# Patient Record
Sex: FEMALE | Race: WHITE | NOT HISPANIC OR LATINO | URBAN - METROPOLITAN AREA
[De-identification: names, ages, dates, MRNs, and addresses within clinical notes are randomized per-mention and may not be internally consistent; named-entity substitution may affect disease eponyms.]

---

## 2017-11-27 ENCOUNTER — EMERGENCY (EMERGENCY)
Facility: HOSPITAL | Age: 22
LOS: 1 days | End: 2017-11-27

## 2017-11-27 VITALS
RESPIRATION RATE: 20 BRPM | OXYGEN SATURATION: 100 % | SYSTOLIC BLOOD PRESSURE: 107 MMHG | HEART RATE: 100 BPM | DIASTOLIC BLOOD PRESSURE: 76 MMHG | WEIGHT: 108.03 LBS | TEMPERATURE: 99 F | HEIGHT: 61 IN

## 2017-11-27 DIAGNOSIS — Z98.89 OTHER SPECIFIED POSTPROCEDURAL STATES: Chronic | ICD-10-CM

## 2020-02-04 ENCOUNTER — APPOINTMENT (OUTPATIENT)
Dept: ORTHOPEDIC SURGERY | Facility: CLINIC | Age: 25
End: 2020-02-04
Payer: MEDICAID

## 2020-02-04 VITALS
BODY MASS INDEX: 28.32 KG/M2 | HEIGHT: 61 IN | WEIGHT: 150 LBS | DIASTOLIC BLOOD PRESSURE: 67 MMHG | SYSTOLIC BLOOD PRESSURE: 105 MMHG | HEART RATE: 91 BPM

## 2020-02-04 DIAGNOSIS — M22.2X2 PATELLOFEMORAL DISORDERS, LEFT KNEE: ICD-10-CM

## 2020-02-04 DIAGNOSIS — M76.52 PATELLAR TENDINITIS, LEFT KNEE: ICD-10-CM

## 2020-02-04 PROCEDURE — 73564 X-RAY EXAM KNEE 4 OR MORE: CPT | Mod: LT

## 2020-02-04 PROCEDURE — 99203 OFFICE O/P NEW LOW 30 MIN: CPT

## 2020-02-04 NOTE — PHYSICAL EXAM
[de-identified] : Physical Exam:\par General: Well appearing, no acute distress, A&O\par Neurologic: A&Ox3, No focal deficits\par Head: NCAT without abrasions, lacerations, or ecchymosis to head, face, or scalp\par Eyes: No scleral icterus, no gross abnormalities\par Respiratory: Equal chest wall expansion bilaterally, no accessory muscle use\par Lymphatic: No lymphadenopathy palpated\par Skin: Warm and dry\par Psychiatric: Normal mood and affect\par \par Left Knee: Range of Motion in Degrees	\par 	  Claimant:	Normal:	\par Flexion Active	 135 	 135-degrees	\par Flexion Passive	 135	 135-degrees	\par Extension Active	 0-5	 0-5-degrees	\par Extension Passive	 0-5	 0-5-degrees	\par \par No tenderness over the tibial tubercle. Tenderness over the tendon at this time. No weakness to flexion/extension. Tenderness over the pes bursa. No evidence of instability in the AP plane or varus or valgus stress. Negative Lachman. Negative pivot shift. Negative anterior drawer test. Negative posterior drawer test. Negative Rissa. Negative Apley grind. No medial or lateral joint line tenderness. No tenderness over the medial and lateral facet of the patella. No patellofemoral crepitations. No lateral tilting patella. No patella apprehension. No crepitation in the medial and lateral femoral condyle. No proximal or distal swelling, edema or tenderness. No gross motor or sensory deficits. No intra-articular swelling. Extra-articular swelling over the proximal medial aspect of the tibia. 2+ DP and PT pulses. No varus or valgus malalignment. Skin is intact. No rashes, scars or lesions. \par  \par Right Knee: Range of Motion in Degrees	\par 	  Claimant:	Normal:	\par Flexion Active	 135 	 135-degrees	\par Flexion Passive	 135	 135-degrees	\par Extension Active	 0-5	 0-5-degrees	\par Extension Passive	 0-5	 0-5-degrees	\par \par No weakness to flexion/extension. No evidence of instability in the AP plane or varus or valgus stress. Negative Lachman. Negative pivot shift. Negative anterior drawer test. Negative posterior drawer test. Negative Rissa. Negative Apley grind. No medial or lateral joint line tenderness. No tenderness over the medial and lateral facet of the patella. No patellofemoral crepitations. No lateral tilting patella. No patellar apprehension. No crepitation in the medial and lateral femoral condyle. No proximal or distal swelling, edema or tenderness. No gross motor or sensory deficits. No intra-articular swelling. 2+ DP and PT pulses. No varus or valgus malalignment. Skin is intact. No rashes, scars or lesions. [de-identified] : The following radiographs were ordered and read by me during this patients visit. I reviewed each radiograph in detail with the patient and discussed the findings as highlighted below. \par \par 4 views of the left knee show no fracture, dislocation or bony lesions. There is no evidence of degenerative change in the medial, lateral and patellofemoral compartments with maintenance of the joint space. Otherwise unremarkable.

## 2020-02-04 NOTE — CONSULT LETTER
[Dear  ___] : Dear  [unfilled], [Please see my note below.] : Please see my note below. [Consult Closing:] : Thank you very much for allowing me to participate in the care of this patient.  If you have any questions, please do not hesitate to contact me. [Consult Letter:] : I had the pleasure of evaluating your patient, [unfilled]. [Sincerely,] : Sincerely, [FreeTextEntry3] : Ever Pena DO.\par Sports Medicine \par \par Orthopaedic Surgery\par \par [facility]\par \par [address] \par [address]\[place] [address]\par \par [address] \[address] \[place] [address]\par \par Tel [phone]\par Fax [phone]\par \par  [FreeTextEntry2] : JAYDEN ESQUIVEL MD\par

## 2020-02-04 NOTE — HISTORY OF PRESENT ILLNESS
[Stable] : stable [de-identified] : HALIMA is a 24 year female who presents today with complaints of left knee pain after falling out of her bed directly onto her left knee in January. Since then she denies instability, catching or locking. Denies numbness/tingling. Her pain is to the anterior knee going up the stairs. She denies ecchymosis/ cracking or a pop sound after injury. She denies prior treatment for this issue.

## 2020-02-04 NOTE — DISCUSSION/SUMMARY
[de-identified] : HALIMA is a 24 year female with left  knee pain secondary to patellar tendonitis and patellofemoral pain syndrome. I have explained to the patient the anatomy of the patellofemoral joint. It includes the extensor mechanism (quadriceps tendon, quadriceps muscles, patella, patella tendon, and medial and lateral retinaculum). I have shown the patient that the articular cartilage gets a little softened. This is what is known as chondromalacia. If one theoretically were to remove or scrape all the articular cartilage, it would be identified as arthritis. Somewhat paradoxically, however, chondromalacia does not necessarily lead to arthritis or at least there is no evidence irrefutable at this point in time.\par \par Chondromalacia or anterior knee pain is a syndrome that hurts the patients more than it causes destruction to the knee; thus pain is not associated with destruction. Likewise, noises, cracking, and popping for the most part are not anymore significant than people cracking their knuckles. It is certainly not a sign of damage to the joint.\par Over 90% of the people with patellofemoral problems will get better if they understand the weight bearing stresses that are applied to the patellofemoral joint. The forces can range from 2 to 9 times your body weight per step and with abnormal alignment the average is usually higher. \par \par The treatment is to minimize weight-bearing stress and to strengthen the surrounding muscles. From a practical point of view, one can divide their lifestyle into activities of daily living, conditioning, and recreation. In activities of daily living one should feel free to walk around as necessary but only for functioning. If one has an option between stairs and an escalator, the latter is preferable.\par \par The conditioning aspect should be a non weight bearing program which is best achieved by bicycle riding at least 3 to 4 days a week. It should be done with increasing resistance for at least a half hour. The seat of the bike should always be kept at a position so that the knee stays within a 0 to 90 degree arc. This will avoid hyperextension and flexion beyond 90 degrees, which tends to aggravate symptoms of discomfort. Leg extension for stretching exercises are similarly kept within this arc of motion. Step machines are not advised as they tend to aggravate patellofemoral symptoms as do squats. A [place] machine is an alternative that is usually acceptable.\par \par The recreational part of their life is based on the fact that since pain is not leading to destruction, we will compromise and allow a recreational lifestyle. If indeed activity, albeit associated with impact does not yield any symptoms, they should feel free to participate. If an increase in activities is associated with increasing discomfort, the patient should use "common sense" and cut back on the level of activity. Recreation, however, is never to be used for conditioning even in an asymptomatic patient. The patient must always maintain a conditioning program. I think the patient understands this explanation.\par \par While over 90% of the people with this syndrome will do well non-operatively, some conscientious patients will still have symptoms. If so, they should be reevaluated to ascertain if they fall into a small category of people who require physical therapy or surgery.\par \par At this time she elected for nonoperative management with meloxicam and physical therapy. I gave her prescription for both. She was educated on the negative sequela with use of anti-depressants and NSAIDs and was advised to D/C medication if symptoms occur and to get clearance by her providing doctor before taking this medication. I will see her back in 8 weeks for clinical assessment. If she is pain free at that time we do not need to see her and will see her in the future prn. She agrees with the above plan and all questions were answered.

## 2020-12-28 ENCOUNTER — EMERGENCY (EMERGENCY)
Facility: HOSPITAL | Age: 25
LOS: 1 days | End: 2020-12-28
Admitting: EMERGENCY MEDICINE
Payer: MEDICAID

## 2020-12-28 DIAGNOSIS — Z98.89 OTHER SPECIFIED POSTPROCEDURAL STATES: Chronic | ICD-10-CM

## 2020-12-28 PROCEDURE — 99285 EMERGENCY DEPT VISIT HI MDM: CPT

## 2020-12-28 PROCEDURE — 93010 ELECTROCARDIOGRAM REPORT: CPT

## 2020-12-31 ENCOUNTER — NON-APPOINTMENT (OUTPATIENT)
Age: 25
End: 2020-12-31

## 2020-12-31 ENCOUNTER — APPOINTMENT (OUTPATIENT)
Dept: CARDIOLOGY | Facility: CLINIC | Age: 25
End: 2020-12-31
Payer: MEDICAID

## 2020-12-31 VITALS
OXYGEN SATURATION: 99 % | BODY MASS INDEX: 27.94 KG/M2 | HEART RATE: 105 BPM | HEIGHT: 61 IN | WEIGHT: 148 LBS | DIASTOLIC BLOOD PRESSURE: 76 MMHG | SYSTOLIC BLOOD PRESSURE: 110 MMHG | TEMPERATURE: 96.8 F

## 2020-12-31 DIAGNOSIS — Z82.49 FAMILY HISTORY OF ISCHEMIC HEART DISEASE AND OTHER DISEASES OF THE CIRCULATORY SYSTEM: ICD-10-CM

## 2020-12-31 DIAGNOSIS — Z86.59 PERSONAL HISTORY OF OTHER MENTAL AND BEHAVIORAL DISORDERS: ICD-10-CM

## 2020-12-31 DIAGNOSIS — R00.2 PALPITATIONS: ICD-10-CM

## 2020-12-31 DIAGNOSIS — Z78.9 OTHER SPECIFIED HEALTH STATUS: ICD-10-CM

## 2020-12-31 DIAGNOSIS — Z00.00 ENCOUNTER FOR GENERAL ADULT MEDICAL EXAMINATION W/OUT ABNORMAL FINDINGS: ICD-10-CM

## 2020-12-31 DIAGNOSIS — R07.9 CHEST PAIN, UNSPECIFIED: ICD-10-CM

## 2020-12-31 DIAGNOSIS — Z87.898 PERSONAL HISTORY OF OTHER SPECIFIED CONDITIONS: ICD-10-CM

## 2020-12-31 PROCEDURE — 93000 ELECTROCARDIOGRAM COMPLETE: CPT

## 2020-12-31 PROCEDURE — 99203 OFFICE O/P NEW LOW 30 MIN: CPT

## 2020-12-31 PROCEDURE — 99072 ADDL SUPL MATRL&STAF TM PHE: CPT

## 2020-12-31 RX ORDER — QUETIAPINE 200 MG/1
200 TABLET, FILM COATED ORAL
Refills: 0 | Status: ACTIVE | COMMUNITY

## 2020-12-31 RX ORDER — GABAPENTIN 100 MG
100 TABLET ORAL
Refills: 0 | Status: ACTIVE | COMMUNITY

## 2020-12-31 NOTE — DISCUSSION/SUMMARY
[FreeTextEntry1] : I reassured the patient that I did not think there was any cardiac issues involved.

## 2020-12-31 NOTE — HISTORY OF PRESENT ILLNESS
[FreeTextEntry1] : She has no chest pain\par She has no shortness of breath\par She has  palpitations\par She has no syncope\par She is neurologically intact\par She has no edema\par She has no skin rashes\par

## 2020-12-31 NOTE — REASON FOR VISIT
[Consultation] : a consultation regarding [Palpitations] : palpitations [FreeTextEntry1] : I saw this highly anxious 25-year-old woman in cardiac consultation on 12/31/20\par She has been treated with antianxiety medication has had as she describes psychotic breaks, and when she gets anxious her heart races.\par There is no family history of any medical issues

## 2020-12-31 NOTE — PHYSICAL EXAM
[General Appearance - Well Developed] : well developed [Normal Appearance] : normal appearance [Well Groomed] : well groomed [General Appearance - Well Nourished] : well nourished [No Deformities] : no deformities [General Appearance - In No Acute Distress] : no acute distress [Normal Conjunctiva] : the conjunctiva exhibited no abnormalities [Eyelids - No Xanthelasma] : the eyelids demonstrated no xanthelasmas [Normal Oral Mucosa] : normal oral mucosa [No Oral Pallor] : no oral pallor [No Oral Cyanosis] : no oral cyanosis [Normal Jugular Venous A Waves Present] : normal jugular venous A waves present [Normal Jugular Venous V Waves Present] : normal jugular venous V waves present [No Jugular Venous Hatch A Waves] : no jugular venous hatch A waves [Heart Rate And Rhythm] : heart rate and rhythm were normal [Heart Sounds] : normal S1 and S2 [Murmurs] : no murmurs present [Respiration, Rhythm And Depth] : normal respiratory rhythm and effort [Exaggerated Use Of Accessory Muscles For Inspiration] : no accessory muscle use [Auscultation Breath Sounds / Voice Sounds] : lungs were clear to auscultation bilaterally [Abdomen Soft] : soft [Abdomen Tenderness] : non-tender [Abdomen Mass (___ Cm)] : no abdominal mass palpated [Abnormal Walk] : normal gait [Gait - Sufficient For Exercise Testing] : the gait was sufficient for exercise testing [Nail Clubbing] : no clubbing of the fingernails [Cyanosis, Localized] : no localized cyanosis [Petechial Hemorrhages (___cm)] : no petechial hemorrhages [Skin Color & Pigmentation] : normal skin color and pigmentation [] : no rash [No Venous Stasis] : no venous stasis [Skin Lesions] : no skin lesions [No Skin Ulcers] : no skin ulcer [No Xanthoma] : no  xanthoma was observed [Oriented To Time, Place, And Person] : oriented to person, place, and time [Affect] : the affect was normal [Mood] : the mood was normal [No Anxiety] : not feeling anxious

## 2021-01-26 ENCOUNTER — APPOINTMENT (OUTPATIENT)
Dept: MRI IMAGING | Facility: CLINIC | Age: 26
End: 2021-01-26

## 2024-05-10 ENCOUNTER — TELEPHONE (OUTPATIENT)
Age: 29
End: 2024-05-10

## 2024-05-10 NOTE — TELEPHONE ENCOUNTER
Patient called in today requesting to establish care.  During the confirming of 3 ID I did update the phone and address.  When I asked who she would like to become established with she stated she did not care as she needed to get a PCP but also had a UTI.  I asked to put Phylicia on hold at this point.  I called [place] nurse and did warm transfer to [facility] since non established patient but stated UTI.

## 2024-09-21 ENCOUNTER — OFFICE VISIT (OUTPATIENT)
Dept: URGENT CARE | Facility: CLINIC | Age: 29
End: 2024-09-21
Payer: COMMERCIAL

## 2024-09-21 VITALS
DIASTOLIC BLOOD PRESSURE: 65 MMHG | TEMPERATURE: 97 F | SYSTOLIC BLOOD PRESSURE: 89 MMHG | RESPIRATION RATE: 18 BRPM | HEART RATE: 92 BPM | OXYGEN SATURATION: 98 %

## 2024-09-21 DIAGNOSIS — J02.9 SORE THROAT: ICD-10-CM

## 2024-09-21 DIAGNOSIS — J02.0 STREP PHARYNGITIS: Primary | ICD-10-CM

## 2024-09-21 LAB — S PYO AG THROAT QL: POSITIVE

## 2024-09-21 PROCEDURE — 99213 OFFICE O/P EST LOW 20 MIN: CPT | Performed by: PHYSICIAN ASSISTANT

## 2024-09-21 PROCEDURE — 87880 STREP A ASSAY W/OPTIC: CPT | Performed by: PHYSICIAN ASSISTANT

## 2024-09-21 RX ORDER — RISPERIDONE 0.5 MG/1
0.5 TABLET ORAL DAILY
COMMUNITY
Start: 2024-08-08

## 2024-09-21 RX ORDER — BUPRENORPHINE AND NALOXONE 2; .5 MG/1; MG/1
FILM, SOLUBLE BUCCAL; SUBLINGUAL
COMMUNITY
Start: 2024-09-09

## 2024-09-21 RX ORDER — CEPHALEXIN 500 MG/1
500 CAPSULE ORAL EVERY 12 HOURS SCHEDULED
Qty: 20 CAPSULE | Refills: 0 | Status: SHIPPED | OUTPATIENT
Start: 2024-09-21 | End: 2024-10-01

## 2024-09-21 RX ORDER — LAMOTRIGINE 100 MG/1
100 TABLET ORAL DAILY
COMMUNITY
Start: 2024-06-21

## 2024-09-21 NOTE — PROGRESS NOTES
[facility]        NAME: Phylicia Villalba is a 28 y.o. female  : 1995    MRN: [id number]  DATE: 2024  TIME: 2:40 PM    Assessment and Plan   Strep pharyngitis [J02.0]  1. Strep pharyngitis  cephalexin (KEFLEX) 500 mg capsule      2. Sore throat  POCT rapid strepA            Patient Instructions   Rapid strep is positive.  Take Keflex as prescribed.  Patient denies any prior reaction to cephalosporins.  Increase fluids and rest.  Tylenol/Ibuprofen for pain/fever  Salt water gargles and chloraseptic spray  Throat Coat Tea  Follow up with PCP if symptoms do not improve or worsen  Report to the ER with difficulty swallowing or fever uncontrolled with tylenol and ibuprofen     If tests have been performed at [facility], our office will contact you with results if changes need to be made to the care plan discussed with you at the visit.  You can review your full results on [facility]  Follow up with PCP in 3-5 days.  Proceed to  ER if symptoms worsen.    Chief Complaint     Chief Complaint   Patient presents with    Sore Throat     Patient here with sore throat, headache, swollen glands and nausea for 2 days now.          History of Present Illness       HPI  This is a 28-year-old female here complaining of headache, sore throat and bodyaches for the last 2 to 3 days.  She does not take any medicines for symptoms.  She denies vomiting, diarrhea, fever, nasal congestion, ear pain, cough, shortness of breath or chest pain.  Review of Systems   Review of Systems   Constitutional:  Negative for fever.   HENT:  Positive for sore throat. Negative for congestion and ear pain.    Respiratory:  Negative for cough and shortness of breath.    Cardiovascular:  Negative for chest pain.   Musculoskeletal:  Positive for myalgias.   Neurological:  Positive for headaches.         Current Medications       Current Outpatient Medications:     buprenorphine-naloxone (Suboxone) 2-0.5 mg, TAKE 1 FILM  SUBLINGUALLY EVERY DAY, Disp: , Rfl:     cephalexin (KEFLEX) 500 mg capsule, Take 1 capsule (500 mg total) by mouth every 12 (twelve) hours for 10 days, Disp: 20 capsule, Rfl: 0    lamoTRIgine (LaMICtal) 100 mg tablet, Take 100 mg by mouth daily, Disp: , Rfl:     risperiDONE (RisperDAL) 0.5 mg tablet, Take 0.5 mg by mouth daily, Disp: , Rfl:     Current Allergies     Allergies as of 09/21/2024 - Reviewed 09/21/2024   Allergen Reaction Noted    Amoxicillin Hives 09/21/2024    Penicillins Hives, Rash, and Swelling 08/31/2021            The following portions of the patient's history were reviewed and updated as appropriate: allergies, current medications, past family history, past medical history, past social history, past surgical history and problem list.     History reviewed. No pertinent past medical history.    History reviewed. No pertinent surgical history.    History reviewed. No pertinent family history.      Medications have been verified.        Objective   BP (!) 89/65   Pulse 92   Temp (!) 97 °F (36.1 °C)   Resp 18   SpO2 98%        Physical Exam     Physical Exam  Vitals and nursing note reviewed.   Constitutional:       General: She is not in acute distress.     Appearance: She is well-developed. She is not ill-appearing, toxic-appearing or diaphoretic.   HENT:      Right Ear: Tympanic membrane, ear canal and external ear normal.      Left Ear: Tympanic membrane, ear canal and external ear normal.      Nose: Nose normal.      Mouth/Throat:      Mouth: Mucous membranes are moist.      Pharynx: Posterior oropharyngeal erythema present.   Cardiovascular:      Rate and Rhythm: Normal rate and regular rhythm.   Pulmonary:      Effort: Pulmonary effort is normal.      Breath sounds: Normal breath sounds.   Neurological:      Mental Status: She is alert.

## 2024-12-06 ENCOUNTER — TELEPHONE (OUTPATIENT)
Age: 29
End: 2024-12-06

## 2024-12-06 NOTE — TELEPHONE ENCOUNTER
Patient scheduled on my chart for new patient and for her reason I am not sure if she should be seen sooner.  Can you please check when you have a chance? Thank you    DISPLAY PLAN FREE TEXT

## 2025-04-30 ENCOUNTER — OFFICE VISIT (OUTPATIENT)
Dept: FAMILY MEDICINE CLINIC | Facility: CLINIC | Age: 30
End: 2025-04-30
Payer: COMMERCIAL

## 2025-04-30 VITALS
TEMPERATURE: 98.3 F | SYSTOLIC BLOOD PRESSURE: 110 MMHG | BODY MASS INDEX: 21.53 KG/M2 | HEART RATE: 92 BPM | RESPIRATION RATE: 14 BRPM | WEIGHT: 117 LBS | HEIGHT: 62 IN | OXYGEN SATURATION: 99 % | DIASTOLIC BLOOD PRESSURE: 80 MMHG

## 2025-04-30 DIAGNOSIS — Z00.00 ANNUAL PHYSICAL EXAM: Primary | ICD-10-CM

## 2025-04-30 DIAGNOSIS — N92.6 MISSED MENSES: ICD-10-CM

## 2025-04-30 DIAGNOSIS — F33.1 MODERATE EPISODE OF RECURRENT MAJOR DEPRESSIVE DISORDER (HCC): ICD-10-CM

## 2025-04-30 DIAGNOSIS — E55.9 HYPOVITAMINOSIS D: ICD-10-CM

## 2025-04-30 PROBLEM — F32.A DEPRESSION: Status: ACTIVE | Noted: 2021-08-31

## 2025-04-30 PROBLEM — F41.1 GAD (GENERALIZED ANXIETY DISORDER): Status: ACTIVE | Noted: 2022-07-14

## 2025-04-30 LAB — SL AMB POCT URINE HCG: ABNORMAL

## 2025-04-30 PROCEDURE — 99385 PREV VISIT NEW AGE 18-39: CPT | Performed by: NURSE PRACTITIONER

## 2025-04-30 PROCEDURE — 81025 URINE PREGNANCY TEST: CPT | Performed by: NURSE PRACTITIONER

## 2025-04-30 RX ORDER — LURASIDONE HYDROCHLORIDE 40 MG/1
40 TABLET, FILM COATED ORAL DAILY
COMMUNITY
Start: 2025-04-03

## 2025-04-30 NOTE — PATIENT INSTRUCTIONS
"Patient Education     Routine physical for adults   The Basics   Written by the doctors and editors at [facility]   What is a physical? -- A physical is a routine visit, or \"check-up,\" with your doctor. You might also hear it called a \"wellness visit\" or \"preventive visit.\"  During each visit, the doctor will:   Ask about your physical and mental health   Ask about your habits, behaviors, and lifestyle   Do an exam   Give you vaccines if needed   Talk to you about any medicines you take   Give advice about your health   Answer your questions  Getting regular check-ups is an important part of taking care of your health. It can help your doctor find and treat any problems you have. But it's also important for preventing health problems.  A routine physical is different from a \"sick visit.\" A sick visit is when you see a doctor because of a health concern or problem. Since physicals are scheduled ahead of time, you can think about what you want to ask the doctor.  How often should I get a physical? -- It depends on your age and health. In general, for people age 21 years and older:   If you are younger than 50 years, you might be able to get a physical every 3 years.   If you are 50 years or older, your doctor might recommend a physical every year.  If you have an ongoing health condition, like diabetes or high blood pressure, your doctor will probably want to see you more often.  What happens during a physical? -- In general, each visit will include:   Physical exam - The doctor or nurse will check your height, weight, heart rate, and blood pressure. They will also look at your eyes and ears. They will ask about how you are feeling and whether you have any symptoms that bother you.   Medicines - It's a good idea to bring a list of all the medicines you take to each doctor visit. Your doctor will talk to you about your medicines and answer any questions. Tell them if you are having any side effects that bother you. You " "should also tell them if you are having trouble paying for any of your medicines.   Habits and behaviors - This includes:   Your diet   Your exercise habits   Whether you smoke, drink alcohol, or use drugs   Whether you are sexually active   Whether you feel safe at home  Your doctor will talk to you about things you can do to improve your health and lower your risk of health problems. They will also offer help and support. For example, if you want to quit smoking, they can give you advice and might prescribe medicines. If you want to improve your diet or get more physical activity, they can help you with this, too.   Lab tests, if needed - The tests you get will depend on your age and situation. For example, your doctor might want to check your:   Cholesterol   Blood sugar   Iron level   Vaccines - The recommended vaccines will depend on your age, health, and what vaccines you already had. Vaccines are very important because they can prevent certain serious or deadly infections.   Discussion of screening - \"Screening\" means checking for diseases or other health problems before they cause symptoms. Your doctor can recommend screening based on your age, risk, and preferences. This might include tests to check for:   Cancer, such as breast, prostate, cervical, ovarian, colorectal, prostate, lung, or skin cancer   Sexually transmitted infections, such as chlamydia and gonorrhea   Mental health conditions like depression and anxiety  Your doctor will talk to you about the different types of screening tests. They can help you decide which screenings to have. They can also explain what the results might mean.   Answering questions - The physical is a good time to ask the doctor or nurse questions about your health. If needed, they can refer you to other doctors or specialists, too.  Adults older than 65 years often need other care, too. As you get older, your doctor will talk to you about:   How to prevent falling at " home   Hearing or vision tests   Memory testing   How to take your medicines safely   Making sure that you have the help and support you need at home  All topics are updated as new evidence becomes available and our peer review process is complete.  This topic retrieved from [employer] on: May 02, 2024.  Topic 928064 Version 1.0  Release: 32.4.3 - C32.122  © 2024 UpToDate, Inc. and/or its affiliates. All rights reserved.  Consumer Information Use and Disclaimer   Disclaimer: This generalized information is a limited summary of diagnosis, treatment, and/or medication information. It is not meant to be comprehensive and should be used as a tool to help the user understand and/or assess potential diagnostic and treatment options. It does NOT include all information about conditions, treatments, medications, side effects, or risks that may apply to a specific patient. It is not intended to be medical advice or a substitute for the medical advice, diagnosis, or treatment of a health care provider based on the health care provider's examination and assessment of a patient's specific and unique circumstances. Patients must speak with a health care provider for complete information about their health, medical questions, and treatment options, including any risks or benefits regarding use of medications. This information does not endorse any treatments or medications as safe, effective, or approved for treating a specific patient. UpToDate, Inc. and its affiliates disclaim any warranty or liability relating to this information or the use thereof.The use of this information is governed by the Terms of Use, available at https://www.[employer]uwer.com/en/know/clinical-effectiveness-terms. 2024© UpToDate, Inc. and its affiliates and/or licensors. All rights reserved.  Copyright   © 2024 UpToDate, Inc. and/or its affiliates. All rights reserved.

## 2025-04-30 NOTE — PROGRESS NOTES
Adult Annual Physical  Name: Phylicia Villalba      : 1995      MRN: [id number]  Encounter Provider: DEMETRI Christie  Encounter Date: 2025   Encounter department: [facility] PRIMARY [facility]    :  Assessment & Plan  Annual physical exam  Annual physical completed.  Last seen by primary care physician more than 9 years ago.  Blood work ordered.  Patient has missed menses tested positive for pregnancy at home.  Blood work needed to reinforce.  Orders:    CBC and differential; Future    Comprehensive metabolic panel; Future    Lipid panel; Future    TSH, 3rd generation with Free T4 reflex; Future    Missed menses  Positive urine in office.  Quantitative ordered.  Patient does have upcoming appointment with [facility]  Orders:    POCT urine HCG    hCG, quantitative; Future    Hypovitaminosis D    Orders:    Vitamin D 25 hydroxy; Future    Moderate episode of recurrent major depressive disorder (HCC)  Patient does have major depressive order.  Follows with therapist.  Is taking Lamictal and Latuda.  Patient is currently pregnant discussed side effects of the medication.  Patient verbalizes that she is aware was taking same medications with her second pregnancy and her son is fine.  Education provided.                Preventive Screenings:  - Diabetes Screening: screening up-to-date and orders placed  - Cholesterol Screening: orders placed   - Hepatitis C screening: orders placed   - HIV screening: screening up-to-date   - Cervical cancer screening: orders placed   - Colon cancer screening: screening not indicated   - Lung cancer screening: screening not indicated     Immunizations:  - Immunizations due: Influenza    Counseling/Anticipatory Guidance:  - Alcohol: discussed moderation in alcohol intake and recommendations for healthy alcohol use.   - Drug use: discussed harms of illicit drug use and how it can negatively impact mental/physical health.   - Tobacco use: discussed harms of tobacco use  "and management options for quitting.   - Dental health: discussed importance of regular tooth brushing, flossing, and dental visits.   - Sexual health: discussed sexually transmitted diseases, partner selection, use of condoms, avoidance of unintended pregnancy, and contraceptive alternatives.   - Diet: discussed recommendations for a healthy/well-balanced diet.   - Exercise: the importance of regular exercise/physical activity was discussed. Recommend exercise 3-5 times per week for at least 30 minutes.   - Injury prevention: discussed safety/seat belts, safety helmets, smoke detectors, carbon monoxide detectors, and smoking near bedding or upholstery.          History of Present Illness     Adult Annual Physical:  Patient presents for annual physical.     Diet and Physical Activity:  - Diet/Nutrition: no special diet.  - Exercise: no formal exercise.    Depression Screening:  - PHQ-2 Score: 2    General Health:  - Sleep: sleeps poorly.  - Hearing: normal hearing bilateral ears.  - Vision: most recent eye exam > 1 year ago and no vision problems.  - Dental: brushes teeth twice daily.    /GYN Health:  - Follows with GYN: yes.   - Contraception:. positive pregnancy test      Advanced Care Planning:  - Has an advanced directive?: no    - Has a durable medical POA?: no    - ACP document given to patient?: no      Review of Systems   Constitutional: Negative.    HENT: Negative.     Eyes: Negative.    Respiratory: Negative.     Cardiovascular: Negative.    Gastrointestinal: Negative.    Endocrine: Negative.    Genitourinary:  Positive for menstrual problem.   Musculoskeletal: Negative.    Allergic/Immunologic: Negative.    Neurological: Negative.    Psychiatric/Behavioral: Negative.           Objective   /80 (BP Location: Left arm, Patient Position: Sitting, Cuff Size: Large)   Pulse 92   Temp 98.3 °F (36.8 °C) (Temporal)   Resp 14   Ht 5' 2\" (1.575 m)   Wt 53.1 kg (117 lb)   SpO2 99%   BMI 21.40 kg/m² "     Physical Exam  Vitals and nursing note reviewed.   Constitutional:       Appearance: Normal appearance. She is well-developed.   HENT:      Head: Normocephalic and atraumatic.      Right Ear: External ear normal.      Left Ear: External ear normal.   Eyes:      Pupils: Pupils are equal, round, and reactive to light.   Cardiovascular:      Rate and Rhythm: Normal rate and regular rhythm.      Pulses: Normal pulses.      Heart sounds: Normal heart sounds.   Pulmonary:      Effort: Pulmonary effort is normal.      Breath sounds: Normal breath sounds.   Abdominal:      General: Bowel sounds are normal.      Palpations: Abdomen is soft.   Musculoskeletal:         General: Normal range of motion.      Cervical back: Normal range of motion.   Skin:     General: Skin is warm and dry.   Neurological:      General: No focal deficit present.      Mental Status: She is alert and oriented to person, place, and time.   Psychiatric:         Mood and Affect: Mood normal.         Behavior: Behavior normal.         Thought Content: Thought content normal.         Judgment: Judgment normal.

## 2025-04-30 NOTE — ASSESSMENT & PLAN NOTE
Patient does have major depressive order.  Follows with therapist.  Is taking Lamictal and Latuda.  Patient is currently pregnant discussed side effects of the medication.  Patient verbalizes that she is aware was taking same medications with her second pregnancy and her son is fine.  Education provided.

## 2025-06-25 ENCOUNTER — TELEPHONE (OUTPATIENT)
Age: 30
End: 2025-06-25

## 2025-06-25 NOTE — TELEPHONE ENCOUNTER
An intergrated care plan was made for patient between behavioral health and physical health that can be viewed on [employer]. Please advise.